# Patient Record
Sex: FEMALE | Race: OTHER
[De-identification: names, ages, dates, MRNs, and addresses within clinical notes are randomized per-mention and may not be internally consistent; named-entity substitution may affect disease eponyms.]

---

## 2017-03-29 ENCOUNTER — HOSPITAL ENCOUNTER (EMERGENCY)
Dept: HOSPITAL 12 - ER | Age: 52
Discharge: HOME | End: 2017-03-29
Payer: MEDICAID

## 2017-03-29 VITALS — SYSTOLIC BLOOD PRESSURE: 143 MMHG | DIASTOLIC BLOOD PRESSURE: 87 MMHG

## 2017-03-29 VITALS — HEIGHT: 62 IN | BODY MASS INDEX: 28.34 KG/M2 | WEIGHT: 154 LBS

## 2017-03-29 DIAGNOSIS — R07.89: Primary | ICD-10-CM

## 2017-03-29 DIAGNOSIS — Z79.82: ICD-10-CM

## 2017-03-29 DIAGNOSIS — I10: ICD-10-CM

## 2017-03-29 DIAGNOSIS — F41.9: ICD-10-CM

## 2017-03-29 DIAGNOSIS — K21.9: ICD-10-CM

## 2017-03-29 DIAGNOSIS — F43.9: ICD-10-CM

## 2017-03-29 DIAGNOSIS — E78.5: ICD-10-CM

## 2017-03-29 LAB
BASOPHILS # BLD AUTO: 0.1 K/UL (ref 0–0.2)
BASOPHILS NFR BLD AUTO: 0.7 % (ref 0–2)
BUN SERPL-MCNC: 15 MG/DL (ref 7–18)
CALCIUM SERPL-MCNC: 9.2 MG/DL (ref 8.5–10.1)
CHLORIDE SERPL-SCNC: 103 MMOL/L (ref 98–107)
CO2 SERPL-SCNC: 28 MMOL/L (ref 21–32)
CREAT SERPL-MCNC: 0.7 MG/DL (ref 0.6–1.3)
EOSINOPHIL # BLD AUTO: 0.1 K/UL (ref 0–0.7)
EOSINOPHIL NFR BLD AUTO: 1.6 % (ref 0–7)
ERYTHROCYTE [DISTWIDTH] IN BLOOD BY AUTOMATED COUNT: 11.9 % (ref 11.5–14.5)
GFR SERPLBLD BASED ON 1.73 SQ M-ARVRAT: 88 ML/MIN (ref 60–?)
GLUCOSE SERPL-MCNC: 100 MG/DL (ref 74–106)
HCT VFR BLD AUTO: 45.2 % (ref 37–47)
HGB BLD-MCNC: 15 G/DL (ref 12–16)
LYMPHOCYTES # BLD AUTO: 3.4 K/UL (ref 0.8–4.8)
LYMPHOCYTES NFR BLD AUTO: 37.1 % (ref 20.5–51.5)
MCH RBC QN AUTO: 30.5 UUG (ref 27–31)
MCHC RBC AUTO-ENTMCNC: 33 G/DL (ref 32–37)
MCV RBC AUTO: 92 FL (ref 81–99)
MONOCYTES # BLD AUTO: 0.5 K/UL (ref 0.1–1.3)
MONOCYTES NFR BLD AUTO: 5.8 % (ref 0–11)
NEUTROPHILS # BLD AUTO: 5 K/UL (ref 1.8–8.9)
NEUTROPHILS NFR BLD AUTO: 54.8 % (ref 38.5–71.5)
PLATELET # BLD AUTO: 239 K/UL (ref 150–450)
POTASSIUM SERPL-SCNC: 3.9 MMOL/L (ref 3.5–5.1)
RBC # BLD AUTO: 4.91 MIL/UL (ref 4.2–5.4)
SODIUM SERPL-SCNC: 141 MMOL/L (ref 136–145)
WBC # BLD AUTO: 9.1 K/UL (ref 4–11.2)

## 2017-03-29 PROCEDURE — 85025 COMPLETE CBC W/AUTO DIFF WBC: CPT

## 2017-03-29 PROCEDURE — 84484 ASSAY OF TROPONIN QUANT: CPT

## 2017-03-29 PROCEDURE — A4663 DIALYSIS BLOOD PRESSURE CUFF: HCPCS

## 2017-03-29 PROCEDURE — 80048 BASIC METABOLIC PNL TOTAL CA: CPT

## 2017-03-29 PROCEDURE — 71010: CPT

## 2017-03-29 PROCEDURE — C9113 INJ PANTOPRAZOLE SODIUM, VIA: HCPCS

## 2017-03-29 PROCEDURE — 36415 COLL VENOUS BLD VENIPUNCTURE: CPT

## 2017-03-29 PROCEDURE — 93005 ELECTROCARDIOGRAM TRACING: CPT

## 2017-03-29 PROCEDURE — 85730 THROMBOPLASTIN TIME PARTIAL: CPT

## 2017-03-29 PROCEDURE — 96365 THER/PROPH/DIAG IV INF INIT: CPT

## 2017-03-29 PROCEDURE — 99285 EMERGENCY DEPT VISIT HI MDM: CPT

## 2017-03-29 NOTE — NUR
Patient discharged to home in stable conditon.  Written and verbal after care 
instructions given. 

Patient verbalizes understanding of instructions.

## 2017-03-29 NOTE — NUR
PROTONIX COMPLETED IN 30 MINUTES PER DR. BOYER'S ORDER. SEE EMERGENCY DEPARTMENT 
PHYSICIAN RECORDS/ADDITIONAL ORDERS.

## 2017-08-09 ENCOUNTER — HOSPITAL ENCOUNTER (EMERGENCY)
Dept: HOSPITAL 12 - ER | Age: 52
Discharge: HOME | End: 2017-08-09
Payer: MEDICAID

## 2017-08-09 VITALS — DIASTOLIC BLOOD PRESSURE: 78 MMHG | SYSTOLIC BLOOD PRESSURE: 137 MMHG

## 2017-08-09 VITALS — HEIGHT: 64 IN | WEIGHT: 164 LBS | BODY MASS INDEX: 28 KG/M2

## 2017-08-09 DIAGNOSIS — Z79.82: ICD-10-CM

## 2017-08-09 DIAGNOSIS — N39.0: Primary | ICD-10-CM

## 2017-08-09 LAB
ALP SERPL-CCNC: 202 U/L (ref 50–136)
ALT SERPL W/O P-5'-P-CCNC: 119 U/L (ref 14–59)
AMORPH PHOS CRY URNS QL MICRO: (no result) /HPF
APPEARANCE UR: (no result)
AST SERPL-CCNC: 231 U/L (ref 15–37)
BASOPHILS # BLD AUTO: 0 K/UL (ref 0–8)
BASOPHILS NFR BLD AUTO: 0.1 % (ref 0–2)
BILIRUB DIRECT SERPL-MCNC: 0.2 MG/DL (ref 0–0.2)
BILIRUB SERPL-MCNC: 0.4 MG/DL (ref 0.2–1)
BILIRUB UR QL STRIP: NEGATIVE
BUN SERPL-MCNC: 12 MG/DL (ref 7–18)
CHLORIDE SERPL-SCNC: 105 MMOL/L (ref 98–107)
CO2 SERPL-SCNC: 30 MMOL/L (ref 21–32)
COLOR UR: YELLOW
CREAT SERPL-MCNC: 0.7 MG/DL (ref 0.6–1.3)
DEPRECATED SQUAMOUS URNS QL MICRO: (no result) /HPF
EOSINOPHIL # BLD AUTO: 0.1 K/UL (ref 0–0.7)
EOSINOPHIL NFR BLD AUTO: 0.9 % (ref 0–7)
GLUCOSE SERPL-MCNC: 136 MG/DL (ref 74–106)
GLUCOSE UR STRIP-MCNC: NEGATIVE MG/DL
HCT VFR BLD AUTO: 41.6 % (ref 37–47)
HGB BLD-MCNC: 13.8 G/DL (ref 12–16)
HGB UR QL STRIP: (no result)
KETONES UR STRIP-MCNC: NEGATIVE MG/DL
LEUKOCYTE ESTERASE UR QL STRIP: (no result)
LIPASE SERPL-CCNC: 200 U/L (ref 73–393)
LYMPHOCYTES # BLD AUTO: 1.4 K/UL (ref 0.8–4.8)
LYMPHOCYTES NFR BLD AUTO: 13.2 % (ref 20.5–51.5)
MCH RBC QN AUTO: 30.7 UUG (ref 27–31)
MCHC RBC AUTO-ENTMCNC: 33 G/DL (ref 32–37)
MCV RBC AUTO: 92.8 FL (ref 81–99)
MONOCYTES # BLD AUTO: 0.6 K/UL (ref 0.1–1.3)
MONOCYTES NFR BLD AUTO: 5.9 % (ref 0–11)
MUCOUS THREADS URNS QL MICRO: (no result) /LPF
NEUTROPHILS # BLD AUTO: 8.3 K/UL (ref 1.8–8.9)
NEUTROPHILS NFR BLD AUTO: 79.9 % (ref 38.5–71.5)
NITRITE UR QL STRIP: NEGATIVE
PH UR STRIP: 6.5 [PH] (ref 5–8)
PLATELET # BLD AUTO: 238 K/UL (ref 150–450)
POTASSIUM SERPL-SCNC: 3.7 MMOL/L (ref 3.5–5.1)
PROT UR QL STRIP: NEGATIVE
RBC # BLD AUTO: 4.48 MIL/UL (ref 4.2–5.4)
RBC #/AREA URNS HPF: (no result) /HPF (ref 0–3)
SP GR UR STRIP: 1.02 (ref 1–1.03)
UROBILINOGEN UR STRIP-MCNC: 1 E.U./DL
WBC # BLD AUTO: 10.4 K/UL (ref 4–11.2)
WS STN SPEC: 7.9 G/DL (ref 6.4–8.2)

## 2017-08-09 PROCEDURE — A4663 DIALYSIS BLOOD PRESSURE CUFF: HCPCS

## 2017-08-09 NOTE — NUR
Patient discharged to home in stable conditon.  Written and verbal after care 
instructions given. 

Patient verbalizes understanding of instructions. Pt walked out of ER 
unassisted with belongings and  at side...

## 2017-08-09 NOTE — NUR
RECEIVED REPORT FROM DAYSHIFT NURSE, PT ALERT, ORIENTED X 4, NO RESP DISTRESS 
NOTED OR REPORTED UPON ASSESSMENT... PT HERE FOR BACK AND FLANK PAIN RADIATING 
TO ABDOMEN PAIN 08/10...

## 2018-11-23 ENCOUNTER — HOSPITAL ENCOUNTER (EMERGENCY)
Dept: HOSPITAL 12 - ER | Age: 53
Discharge: HOME | End: 2018-11-23
Payer: MEDICAID

## 2018-11-23 VITALS — WEIGHT: 178 LBS | BODY MASS INDEX: 31.54 KG/M2 | HEIGHT: 63 IN

## 2018-11-23 VITALS — SYSTOLIC BLOOD PRESSURE: 140 MMHG | DIASTOLIC BLOOD PRESSURE: 88 MMHG

## 2018-11-23 DIAGNOSIS — I10: ICD-10-CM

## 2018-11-23 DIAGNOSIS — Z79.82: ICD-10-CM

## 2018-11-23 DIAGNOSIS — R07.89: ICD-10-CM

## 2018-11-23 DIAGNOSIS — H92.02: Primary | ICD-10-CM

## 2018-11-23 DIAGNOSIS — Z79.899: ICD-10-CM

## 2018-11-23 LAB
ALP SERPL-CCNC: 115 U/L (ref 50–136)
ALT SERPL W/O P-5'-P-CCNC: 28 U/L (ref 14–59)
AST SERPL-CCNC: 17 U/L (ref 15–37)
BASOPHILS # BLD AUTO: 0 K/UL (ref 0–8)
BASOPHILS NFR BLD AUTO: 0.3 % (ref 0–2)
BILIRUB DIRECT SERPL-MCNC: 0.1 MG/DL (ref 0–0.2)
BILIRUB SERPL-MCNC: 0.2 MG/DL (ref 0.2–1)
BUN SERPL-MCNC: 12 MG/DL (ref 7–18)
CHLORIDE SERPL-SCNC: 105 MMOL/L (ref 98–107)
CO2 SERPL-SCNC: 29 MMOL/L (ref 21–32)
CREAT SERPL-MCNC: 0.6 MG/DL (ref 0.6–1.3)
EOSINOPHIL # BLD AUTO: 0.1 K/UL (ref 0–0.7)
EOSINOPHIL NFR BLD AUTO: 0.8 % (ref 0–7)
GLUCOSE SERPL-MCNC: 113 MG/DL (ref 74–106)
HCT VFR BLD AUTO: 42.5 % (ref 31.2–41.9)
HGB BLD-MCNC: 14.2 G/DL (ref 10.9–14.3)
LYMPHOCYTES # BLD AUTO: 2 K/UL (ref 20–40)
LYMPHOCYTES NFR BLD AUTO: 19.6 % (ref 20.5–51.5)
MCH RBC QN AUTO: 31.9 UUG (ref 24.7–32.8)
MCHC RBC AUTO-ENTMCNC: 34 G/DL (ref 32.3–35.6)
MCV RBC AUTO: 95.4 FL (ref 75.5–95.3)
MONOCYTES # BLD AUTO: 0.5 K/UL (ref 2–10)
MONOCYTES NFR BLD AUTO: 5.3 % (ref 0–11)
NEUTROPHILS # BLD AUTO: 7.7 K/UL (ref 1.8–8.9)
NEUTROPHILS NFR BLD AUTO: 74 % (ref 38.5–71.5)
PLATELET # BLD AUTO: 254 K/UL (ref 179–408)
POTASSIUM SERPL-SCNC: 4 MMOL/L (ref 3.5–5.1)
RBC # BLD AUTO: 4.46 MIL/UL (ref 3.63–4.92)
WBC # BLD AUTO: 10.4 K/UL (ref 3.8–11.8)
WS STN SPEC: 8 G/DL (ref 6.4–8.2)

## 2018-11-23 PROCEDURE — A4663 DIALYSIS BLOOD PRESSURE CUFF: HCPCS

## 2021-12-07 ENCOUNTER — HOSPITAL ENCOUNTER (EMERGENCY)
Dept: HOSPITAL 12 - ER | Age: 56
Discharge: HOME | End: 2021-12-07
Payer: MEDICAID

## 2021-12-07 VITALS — SYSTOLIC BLOOD PRESSURE: 151 MMHG | DIASTOLIC BLOOD PRESSURE: 87 MMHG

## 2021-12-07 VITALS — WEIGHT: 170 LBS | BODY MASS INDEX: 30.12 KG/M2 | HEIGHT: 63 IN

## 2021-12-07 DIAGNOSIS — Z79.82: ICD-10-CM

## 2021-12-07 DIAGNOSIS — R07.9: Primary | ICD-10-CM

## 2021-12-07 DIAGNOSIS — Z79.899: ICD-10-CM

## 2021-12-07 LAB
ALP SERPL-CCNC: 126 U/L (ref 50–136)
ALT SERPL W/O P-5'-P-CCNC: 31 U/L (ref 14–59)
AST SERPL-CCNC: 14 U/L (ref 15–37)
BILIRUB DIRECT SERPL-MCNC: 0.1 MG/DL (ref 0–0.2)
BILIRUB SERPL-MCNC: 0.3 MG/DL (ref 0.2–1)
BUN SERPL-MCNC: 15 MG/DL (ref 7–18)
CHLORIDE SERPL-SCNC: 104 MMOL/L (ref 98–107)
CO2 SERPL-SCNC: 29 MMOL/L (ref 21–32)
CREAT SERPL-MCNC: 0.7 MG/DL (ref 0.6–1.3)
GLUCOSE SERPL-MCNC: 98 MG/DL (ref 74–106)
HCT VFR BLD AUTO: 43.2 % (ref 31.2–41.9)
MCH RBC QN AUTO: 32.1 UUG (ref 24.7–32.8)
MCV RBC AUTO: 93.6 FL (ref 75.5–95.3)
PLATELET # BLD AUTO: 237 K/UL (ref 179–408)
POTASSIUM SERPL-SCNC: 4.4 MMOL/L (ref 3.5–5.1)
WS STN SPEC: 8.2 G/DL (ref 6.4–8.2)

## 2021-12-07 PROCEDURE — A4663 DIALYSIS BLOOD PRESSURE CUFF: HCPCS

## 2021-12-07 NOTE — NUR
EKG done, saline lock placed, labs collected. Pt resting in NAD at this time. 
Aware of plan to await lab results.
Patient discharged to home in stable condition. Saline lock removed, clear and 
intact. Written and verbal after care instructions given. Patient verbalizes 
understanding of instructions. Stressed follow up or return to ER for worsening 
s/s.
No

## 2022-11-15 ENCOUNTER — HOSPITAL ENCOUNTER (EMERGENCY)
Dept: HOSPITAL 12 - ER | Age: 57
Discharge: HOME | End: 2022-11-15
Payer: MEDICAID

## 2022-11-15 VITALS — WEIGHT: 157 LBS | HEIGHT: 61 IN | BODY MASS INDEX: 29.64 KG/M2

## 2022-11-15 VITALS — SYSTOLIC BLOOD PRESSURE: 142 MMHG | DIASTOLIC BLOOD PRESSURE: 98 MMHG

## 2022-11-15 DIAGNOSIS — R07.9: Primary | ICD-10-CM

## 2022-11-15 DIAGNOSIS — Z20.822: ICD-10-CM

## 2022-11-15 DIAGNOSIS — Z83.3: ICD-10-CM

## 2022-11-15 DIAGNOSIS — F41.9: ICD-10-CM

## 2022-11-15 DIAGNOSIS — Z82.49: ICD-10-CM

## 2022-11-15 DIAGNOSIS — I10: ICD-10-CM

## 2022-11-15 LAB
ALP SERPL-CCNC: 105 U/L (ref 50–136)
ALT SERPL W/O P-5'-P-CCNC: 31 U/L (ref 14–59)
AST SERPL-CCNC: 17 U/L (ref 15–37)
BILIRUB DIRECT SERPL-MCNC: 0.1 MG/DL (ref 0–0.2)
BILIRUB SERPL-MCNC: 0.3 MG/DL (ref 0.2–1)
BUN SERPL-MCNC: 14 MG/DL (ref 7–18)
CHLORIDE SERPL-SCNC: 105 MMOL/L (ref 98–107)
CO2 SERPL-SCNC: 32 MMOL/L (ref 21–32)
CREAT SERPL-MCNC: 0.6 MG/DL (ref 0.6–1.3)
GLUCOSE SERPL-MCNC: 98 MG/DL (ref 74–106)
HCT VFR BLD AUTO: 44.7 % (ref 31.2–41.9)
MCH RBC QN AUTO: 31.3 UUG (ref 24.7–32.8)
MCV RBC AUTO: 94.8 FL (ref 75.5–95.3)
PLATELET # BLD AUTO: 248 K/UL (ref 179–408)
POTASSIUM SERPL-SCNC: 4 MMOL/L (ref 3.5–5.1)
WS STN SPEC: 8.1 G/DL (ref 6.4–8.2)

## 2022-11-15 PROCEDURE — A4663 DIALYSIS BLOOD PRESSURE CUFF: HCPCS

## 2022-11-15 NOTE — NUR
Pt. walked to the ER c/o intermittent chest pain exacerbated by physical 
movement. Per patient, symptoms started last night while she was working. 
Denies n/v and SOB. Hx Hypertension. 

MD evaluated patient at bedside.

## 2022-11-15 NOTE — NUR
Patient discharged to home in stable condition.  Written and verbal after care 
instructions given. 

Patient verbalizes understanding of instructions. Stressed follow up or return 
to ER for worsening s/s.

Patient is a/ox4, NAD noted. Patient is able to walk with steady gait